# Patient Record
Sex: FEMALE | Race: WHITE
[De-identification: names, ages, dates, MRNs, and addresses within clinical notes are randomized per-mention and may not be internally consistent; named-entity substitution may affect disease eponyms.]

---

## 2017-08-07 ENCOUNTER — HOSPITAL ENCOUNTER (OUTPATIENT)
Dept: HOSPITAL 76 - LAB.F | Age: 62
Discharge: HOME | End: 2017-08-07
Attending: PHYSICIAN ASSISTANT
Payer: COMMERCIAL

## 2017-08-07 DIAGNOSIS — Z79.899: ICD-10-CM

## 2017-08-07 DIAGNOSIS — E78.5: Primary | ICD-10-CM

## 2017-08-07 DIAGNOSIS — Z51.81: ICD-10-CM

## 2017-08-07 DIAGNOSIS — E11.9: ICD-10-CM

## 2017-08-07 LAB
ALBUMIN/GLOB SERPL: 1.1 {RATIO} (ref 1–2.2)
ANION GAP SERPL CALCULATED.4IONS-SCNC: 8 MMOL/L (ref 6–13)
BASOPHILS NFR BLD AUTO: 0.1 10^3/UL (ref 0–0.1)
BASOPHILS NFR BLD AUTO: 0.7 %
BILIRUB BLD-MCNC: 0.4 MG/DL (ref 0.2–1)
BUN SERPL-MCNC: 12 MG/DL (ref 6–20)
CALCIUM UR-MCNC: 9.1 MG/DL (ref 8.5–10.3)
CHLORIDE SERPL-SCNC: 106 MMOL/L (ref 101–111)
CHOLEST SERPL-MCNC: 160 MG/DL
CO2 SERPL-SCNC: 25 MMOL/L (ref 21–32)
CREAT SERPLBLD-SCNC: 0.6 MG/DL (ref 0.4–1)
EOSINOPHIL # BLD AUTO: 0.6 10^3/UL (ref 0–0.7)
EOSINOPHIL NFR BLD AUTO: 5 %
ERYTHROCYTE [DISTWIDTH] IN BLOOD BY AUTOMATED COUNT: 14.7 % (ref 12–15)
EST. AVERAGE GLUCOSE BLD GHB EST-MCNC: 123 MG/DL (ref 70–100)
GFRSERPLBLD MDRD-ARVRAT: 101 ML/MIN/{1.73_M2} (ref 89–?)
GLOBULIN SER-MCNC: 3.6 G/DL (ref 2.1–4.2)
GLUCOSE SERPL-MCNC: 120 MG/DL (ref 70–100)
HBA1C BLD-MCNC: 0.58 G/DL
HCT VFR BLD AUTO: 40.4 % (ref 37–47)
HDLC SERPL-MCNC: 47 MG/DL
HDLC SERPL: 3.4 {RATIO} (ref ?–4.4)
HGB UR QL STRIP: 13 G/DL (ref 12–16)
LDLC/HDLC SERPL: 1.8 {RATIO} (ref ?–4.4)
LYMPHOCYTES # SPEC AUTO: 3 10^3/UL (ref 1.5–3.5)
LYMPHOCYTES NFR BLD AUTO: 24.3 %
MCH RBC QN AUTO: 26.9 PG (ref 27–31)
MCHC RBC AUTO-ENTMCNC: 32.3 G/DL (ref 32–36)
MCV RBC AUTO: 83.4 FL (ref 81–99)
MONOCYTES # BLD AUTO: 0.6 10^3/UL (ref 0–1)
MONOCYTES NFR BLD AUTO: 4.9 %
NEUTROPHILS # BLD AUTO: 8 10^3/UL (ref 1.5–6.6)
NEUTROPHILS # SNV AUTO: 12.3 X10^3/UL (ref 4.8–10.8)
NEUTROPHILS NFR BLD AUTO: 65.1 %
NRBC # BLD AUTO: 0 /100WBC
PDW BLD AUTO: 9.9 FL (ref 7.9–10.8)
POTASSIUM SERPL-SCNC: 4.1 MMOL/L (ref 3.5–5)
PROT SPEC-MCNC: 7.5 G/DL (ref 6.7–8.2)
RBC MAR: 4.84 10^6/UL (ref 4.2–5.4)
SODIUM SERPLBLD-SCNC: 139 MMOL/L (ref 135–145)
TRIGL P FAST SERPL-MCNC: 133 MG/DL
VLDLC SERPL-SCNC: 27 MG/DL
WBC # BLD: 12.3 X10^3/UL

## 2017-08-07 PROCEDURE — 83036 HEMOGLOBIN GLYCOSYLATED A1C: CPT

## 2017-08-07 PROCEDURE — 80053 COMPREHEN METABOLIC PANEL: CPT

## 2017-08-07 PROCEDURE — 85025 COMPLETE CBC W/AUTO DIFF WBC: CPT

## 2017-08-07 PROCEDURE — 80061 LIPID PANEL: CPT

## 2017-08-07 PROCEDURE — 36415 COLL VENOUS BLD VENIPUNCTURE: CPT

## 2017-08-17 ENCOUNTER — HOSPITAL ENCOUNTER (OUTPATIENT)
Dept: HOSPITAL 76 - DI | Age: 62
Discharge: HOME | End: 2017-08-17
Attending: PHYSICIAN ASSISTANT
Payer: COMMERCIAL

## 2017-08-17 DIAGNOSIS — Z13.820: Primary | ICD-10-CM

## 2017-08-17 DIAGNOSIS — Z12.31: Primary | ICD-10-CM

## 2017-08-17 DIAGNOSIS — Z78.0: ICD-10-CM

## 2017-08-17 DIAGNOSIS — M85.89: ICD-10-CM

## 2017-08-17 PROCEDURE — 77067 SCR MAMMO BI INCL CAD: CPT

## 2017-08-17 PROCEDURE — 77080 DXA BONE DENSITY AXIAL: CPT

## 2017-08-18 NOTE — MAMMOGRAPHY REPORT
DIGITAL SCREENING MAMMOGRAM:  08/17/2017

 

CLINICAL INDICATION:  A 62-year-old for screening.

 

COMPARISON:  03/2015, 03/2012, 12/2010, 04/2009, 01/2008.

 

TECHNIQUE:  Routine CC and MLO projections were obtained of the breasts.  

 

FINDINGS:  Parenchymal tissue within both breasts is heterogeneously dense, which may lower the sensi
tivity of mammography; however, there are no dominant masses, suspicious microcalcifications, or seco
ndary signs of malignancy. In comparison to the previous studies, there are no significant changes.

 

ASSESSMENT:  NO MAMMOGRAPHIC EVIDENCE OF MALIGNANCY. NO SIGNIFICANT INTERVAL CHANGES. 

 

RECOMMENDATION:  Screening mammography is recommended annually.

 

BIRADS category 1 - negative.

 

STANDARD QUALIFYING STATEMENTS

1. This examination was reviewed with the aid of Computed-Aided Detection (CAD).

2. A negative or benign imaging report should not delay biopsy if clinically suspicious findings are 
present. Consider surgical consultation if warranted. More than 5% of cancers are not identified by i
maging.

3. Dense breasts may obscure an underlying neoplasm.

 

 

 

DD:08/18/2017 14:49:41  DT: 08/18/2017 14:56  JOB #: [id number]  EXT JOB #:[id number]

## 2017-08-18 NOTE — DEXA REPORT
DEXA SCAN: 08/17/2017



CLINICAL INDICATION: Postmenopausal. 



TECHNIQUE: Dual energy x-ray absorptiometry (DXA) was performed on a [employer] 
system. Regions measured are the AP spine, femoral neck, and, if needed, 
forearm.



COMPARISON: None. In accordance with the International Society for Clinical 
Densitometry (ISCD) guidelines, data from previous exams may be reanalyzed 
using current recommendations and techniques. This is done to allow a more 
accurate basis for comparison with the current study.



FINDINGS: The data for the lumbar spine is as follows:









REGION BMD (g/cm/cm) T-SCORE Z-SCORE

 

L1 0.936 -1.6 -0.5

 

L2 0.959 -2.0 -0.9

 

L3 1.121 -0.7 0.4

 

L4 1.039 -1.3 -0.3

 

TOTAL 1.017 -1.4 -0.3





NOTE: All evaluable vertebrae are used for classification.



The data for the hip is as follows:







REGION BMD (g/cm/cm) T-SCORE Z-SCORE

 

Neck 0.795 -1.8 -0.6

 

TOTAL 0.782 -1.8 -1.0





NOTE: The femoral neck or total proximal femur, whichever is lowest, is used 
for classification.



IMPRESSION: THE WHO CLASSIFICATION BASED ON THE INTERNATIONAL REFERENCE 
STANDARD IS OSTEOPENIA. THE FRACTURE RISK IS INCREASED.



RECOMMENDATION: Patients with diagnosis of osteoporosis or osteopenia should 
have regular bone mineral density assessment. For those eligible for Medicare, 
routine testing is allowed once every 2 years. Testing frequency can be 
increased for patients who have rapidly progressing disease or for those who 
are receiving medical therapy to restore bone mass. 



COMMENT: World Health Organization (WHO) definitions for osteoporosis and 
osteopenia:

NORMAL BMD: T-score at -1.0 or higher, fracture risk is low.

OSTEOPENIA BMD: T-score between -1.0 and -2.5, fracture risk is increased.

OSTEOPOROSIS BMD: T-score at -2.5 or lower, fracture risk high.



National Osteoporosis Foundation recommends:

1. Obtain adequate dietary calcium (at least 1200 mg per day) and vitamin D (400
-800 international units per day).

2. Participate, as appropriate, in regular weightbearing and muscle-
strengthening exercise. 

3. Avoid tobacco use and reduce alcohol and caffeine intake. 

4. For more detailed information see the website at www.NOF.org.
MTDD

## 2019-10-10 ENCOUNTER — HOSPITAL ENCOUNTER (OUTPATIENT)
Dept: HOSPITAL 76 - LAB.S | Age: 64
Discharge: HOME | End: 2019-10-10
Attending: INTERNAL MEDICINE
Payer: COMMERCIAL

## 2019-10-10 DIAGNOSIS — E11.9: Primary | ICD-10-CM

## 2019-10-10 LAB
ALBUMIN DIAFP-MCNC: 4 G/DL (ref 3.2–5.5)
ALBUMIN/GLOB SERPL: 1.2 {RATIO} (ref 1–2.2)
ALP SERPL-CCNC: 85 IU/L (ref 42–121)
ALT SERPL W P-5'-P-CCNC: 11 IU/L (ref 10–60)
ANION GAP SERPL CALCULATED.4IONS-SCNC: 9 MMOL/L (ref 6–13)
AST SERPL W P-5'-P-CCNC: 18 IU/L (ref 10–42)
BILIRUB BLD-MCNC: 0.6 MG/DL (ref 0.2–1)
BUN SERPL-MCNC: 14 MG/DL (ref 6–20)
CALCIUM UR-MCNC: 9.3 MG/DL (ref 8.5–10.3)
CHLORIDE SERPL-SCNC: 104 MMOL/L (ref 101–111)
CHOLEST SERPL-MCNC: 259 MG/DL
CO2 SERPL-SCNC: 27 MMOL/L (ref 21–32)
CREAT SERPLBLD-SCNC: 0.6 MG/DL (ref 0.4–1)
EST. AVERAGE GLUCOSE BLD GHB EST-MCNC: 117 MG/DL (ref 70–100)
GFRSERPLBLD MDRD-ARVRAT: 101 ML/MIN/{1.73_M2} (ref 89–?)
GLOBULIN SER-MCNC: 3.3 G/DL (ref 2.1–4.2)
GLUCOSE SERPL-MCNC: 126 MG/DL (ref 70–100)
HB2 TOTAL: 13.8 G/DL
HBA1C BLD-MCNC: 0.53 G/DL
HDLC SERPL-MCNC: 51 MG/DL
HDLC SERPL: 5.1 {RATIO} (ref ?–4.4)
HEMOGLOBIN A1C %: 5.7 % (ref 4.6–6.2)
LDLC SERPL CALC-MCNC: 157 MG/DL
LDLC/HDLC SERPL: 3.1 {RATIO} (ref ?–4.4)
PROT SPEC-MCNC: 7.3 G/DL (ref 6.7–8.2)
SODIUM SERPLBLD-SCNC: 140 MMOL/L (ref 135–145)
VLDLC SERPL-SCNC: 51 MG/DL

## 2019-10-10 PROCEDURE — 83721 ASSAY OF BLOOD LIPOPROTEIN: CPT

## 2019-10-10 PROCEDURE — 36415 COLL VENOUS BLD VENIPUNCTURE: CPT

## 2019-10-10 PROCEDURE — 80053 COMPREHEN METABOLIC PANEL: CPT

## 2019-10-10 PROCEDURE — 82043 UR ALBUMIN QUANTITATIVE: CPT

## 2019-10-10 PROCEDURE — 80061 LIPID PANEL: CPT

## 2019-10-10 PROCEDURE — 84443 ASSAY THYROID STIM HORMONE: CPT

## 2019-10-10 PROCEDURE — 83036 HEMOGLOBIN GLYCOSYLATED A1C: CPT

## 2021-09-19 ENCOUNTER — HOSPITAL ENCOUNTER (OUTPATIENT)
Dept: HOSPITAL 76 - EMS | Age: 66
End: 2021-09-19
Payer: MEDICARE